# Patient Record
Sex: FEMALE | Race: WHITE | NOT HISPANIC OR LATINO | URBAN - METROPOLITAN AREA
[De-identification: names, ages, dates, MRNs, and addresses within clinical notes are randomized per-mention and may not be internally consistent; named-entity substitution may affect disease eponyms.]

---

## 2017-03-16 ENCOUNTER — IMPORTED ENCOUNTER (OUTPATIENT)
Dept: URBAN - METROPOLITAN AREA CLINIC 1 | Facility: CLINIC | Age: 32
End: 2017-03-16

## 2017-03-16 PROBLEM — H52.13: Noted: 2017-03-16

## 2017-03-16 PROCEDURE — S0620 ROUTINE OPHTHALMOLOGICAL EXA: HCPCS

## 2017-03-16 NOTE — PATIENT DISCUSSION
1. Myopia: Rx was given for correction if indicated and requested. 2. Return for an appointment in 1 week for Contact lens check. with Dr. Luis M Villaseñor. 3.  Return for an appointment in 1 year for 38 and Contact lens check. with Dr. Luis M Villaseñor.

## 2017-03-23 ENCOUNTER — IMPORTED ENCOUNTER (OUTPATIENT)
Dept: URBAN - METROPOLITAN AREA CLINIC 1 | Facility: CLINIC | Age: 32
End: 2017-03-23

## 2017-03-23 NOTE — PATIENT DISCUSSION
pt decided since due to insurance coverage it will only cover glasses or CL's every year pt would like to hold off on CL's and try glasses at this time but will return if changes her mind

## 2019-10-25 ENCOUNTER — IMPORTED ENCOUNTER (OUTPATIENT)
Dept: URBAN - METROPOLITAN AREA CLINIC 1 | Facility: CLINIC | Age: 34
End: 2019-10-25

## 2019-10-25 PROBLEM — H52.13: Noted: 2019-10-25

## 2019-10-25 PROCEDURE — S0621 ROUTINE OPHTHALMOLOGICAL EXA: HCPCS

## 2019-10-25 NOTE — PATIENT DISCUSSION
1. Myopia- MRX for glasses given. Patient may consider contacts in the future. Return for an appointment in 1 year 36 with Dr. Joni Schaumann.

## 2022-03-18 PROBLEM — R10.2 PELVIC PAIN: Status: ACTIVE | Noted: 2020-01-06

## 2022-04-02 ASSESSMENT — TONOMETRY
OS_IOP_MMHG: 15
OS_IOP_MMHG: 15
OD_IOP_MMHG: 15
OD_IOP_MMHG: 15

## 2022-04-02 ASSESSMENT — VISUAL ACUITY
OS_SC: J1+
OS_CC: 20/50
OD_CC: 20/50
OD_CC: 20/50
OS_CC: 20/70
OD_SC: J1+